# Patient Record
Sex: MALE | Race: WHITE | Employment: FULL TIME | URBAN - METROPOLITAN AREA
[De-identification: names, ages, dates, MRNs, and addresses within clinical notes are randomized per-mention and may not be internally consistent; named-entity substitution may affect disease eponyms.]

---

## 2017-02-24 ENCOUNTER — OFFICE VISIT (OUTPATIENT)
Dept: FAMILY MEDICINE | Facility: CLINIC | Age: 32
End: 2017-02-24
Payer: COMMERCIAL

## 2017-02-24 VITALS
BODY MASS INDEX: 30.12 KG/M2 | HEIGHT: 72 IN | HEART RATE: 84 BPM | SYSTOLIC BLOOD PRESSURE: 140 MMHG | DIASTOLIC BLOOD PRESSURE: 80 MMHG | RESPIRATION RATE: 19 BRPM | TEMPERATURE: 98.5 F | OXYGEN SATURATION: 97 % | WEIGHT: 222.4 LBS

## 2017-02-24 DIAGNOSIS — K21.9 GASTROESOPHAGEAL REFLUX DISEASE WITHOUT ESOPHAGITIS: ICD-10-CM

## 2017-02-24 DIAGNOSIS — G43.109 MIGRAINE WITH AURA AND WITHOUT STATUS MIGRAINOSUS, NOT INTRACTABLE: Primary | ICD-10-CM

## 2017-02-24 PROCEDURE — 99214 OFFICE O/P EST MOD 30 MIN: CPT | Performed by: FAMILY MEDICINE

## 2017-02-24 NOTE — MR AVS SNAPSHOT
After Visit Summary   2/24/2017    [facility] Ascenc[facility]    MRN: [id number]           Patient Information     Date Of Birth          1985        Visit Information        Provider Department      2/24/2017 2:15 PM Gurwinder Nelson MD [facility]        Today's Diagnoses     Migraine with aura and without status migrainosus, not intractable    -  1    Gastroesophageal reflux disease without esophagitis           Follow-ups after your visit        Your next 10 appointments already scheduled     Mar 07, 2017 12:30 PM CST   MR BRAIN W/O CONTRAST with RSCCMR1   [facility] ([facility])    [address]  [facility] [place] [id number] [phone]           Take your medicines as usual, unless your doctor tells you not to. Bring a list of your current medicines to your exam (including vitamins, minerals and over-the-counter drugs). Also bring the results of similar scans you may have had.  Please remove any body piercings and hair extensions before you arrive.  Follow your doctor s orders. If you do not, we may have to postpone your exam.  You will not have contrast for this exam. You do not need to do anything special to prepare.  The MRI machine uses a strong magnet. Please wear clothes without metal (snaps, zippers). A sweatsuit works well, or we may give you a hospital gown.   **IMPORTANT** THE INSTRUCTIONS BELOW ARE ONLY FOR THOSE PATIENTS WHO HAVE BEEN TOLD THEY WILL RECEIVE SEDATION OR GENERAL ANESTHESIA DURING THEIR MRI PROCEDURE:  IF YOU WILL RECEIVE SEDATION (take medicine to help you relax during your exam):   You must get the medicine from your doctor before you arrive. Bring the medicine to the exam. Do not take it at home.   Arrive one hour early. Bring someone who can take you home after the test. Your medicine will make you sleepy. After the exam, you may not drive, take a bus or take a taxi by yourself.   No eating 8 hours  before your exam. You may have clear liquids up until 4 hours before your exam. (Clear liquids include water, clear tea, black coffee and fruit juice without pulp.)  IF YOU WILL RECEIVE ANESTHESIA (be asleep for your exam):   Arrive 1 1/2 hours early. Bring someone who can take you home after the test. You may not drive, take a bus or take a taxi by yourself.   No eating 8 hours before your exam. You may have clear liquids up until 4 hours before your exam. (Clear liquids include water, clear tea, black coffee and fruit juice without pulp.)   You will spend four to five hours in the recovery room.  Please call the Imaging Department at your exam site with any questions.            Mar 07, 2017  1:30 PM CST   MR HEAD W CONTRAST ANGIOGRAM with RSCCMR1   [facility] ([facility])    [address]  [facility] [address] [phone]           Take your medicines as usual, unless your doctor tells you not to. Bring a list of your current medicines to your exam (including vitamins, minerals and over-the-counter drugs).  You will be given intravenous contrast for this exam. To prepare:   The day before your exam, drink extra fluids at least six 8-ounce glasses (unless your doctor tells you to restrict your fluids).   Have a blood test (creatinine test) within 30 days of your exam. Go to your clinic or Diagnostic Imaging Department for this test.  The MRI machine uses a strong magnet. Please wear clothes without metal (snaps, zippers). A sweatsuit works well, or we may give you a hospital gown.  Please remove any body piercings and hair extensions before you arrive. You will also remove watches, jewelry, hairpins, wallets, dentures, partial dental plates and hearing aids. You may wear contact lenses, and you may be able to wear your rings. We have a safe place to keep your personal items, but it is safer to leave them at home.   **IMPORTANT** THE INSTRUCTIONS  BELOW ARE ONLY FOR THOSE PATIENTS WHO HAVE BEEN TOLD THEY WILL RECEIVE SEDATION OR GENERAL ANESTHESIA DURING THEIR MRI PROCEDURE:  IF YOU WILL RECEIVE SEDATION (take medicine to help you relax during your exam):   You must get the medicine from your doctor before you arrive. Bring the medicine to the exam. Do not take it at home.   Arrive one hour early. Bring someone who can take you home after the test. Your medicine will make you sleepy. After the exam, you may not drive, take a bus or take a taxi by yourself.   No eating 8 hours before your exam. You may have clear liquids up until 4 hours before your exam. (Clear liquids include water, clear tea, black coffee and fruit juice without pulp.)  IF YOU WILL RECEIVE ANESTHESIA (be asleep for your exam):   Arrive 1 1/2 hours early. Bring someone who can take you home after the test. You may not drive, take a bus or take a taxi by yourself.   No eating 8 hours before your exam. You may have clear liquids up until 4 hours before your exam. (Clear liquids include water, clear tea, black coffee and fruit juice without pulp.)  Please call the Imaging Department at your exam site with any questions.              Who to contact     If you have questions or need follow up information about today's clinic visit or your schedule please contact [facility] directly at [phone].  Normal or non-critical lab and imaging results will be communicated to you by MyChart, letter or phone within 4 business days after the clinic has received the results. If you do not hear from us within 7 days, please contact the clinic through MyChart or phone. If you have a critical or abnormal lab result, we will notify you by phone as soon as possible.  Submit refill requests through [employer] or call your pharmacy and they will forward the refill request to us. Please allow 3 business days for your refill to be completed.          Additional Information About Your Visit       "  MyChart Information     [employer] lets you send messages to your doctor, view your test results, renew your prescriptions, schedule appointments and more. To sign up, go to [email or web address] . Click on \"Log in\" on the left side of the screen, which will take you to the Welcome page. Then click on \"Sign up Now\" on the right side of the page.     You will be asked to enter the access code listed below, as well as some personal information. Please follow the directions to create your username and password.     Your access code is: [email or web address]  Expires: 2017  9:46 AM     Your access code will  in 90 days. If you need help or a new code, please call your [facility] clinic or [phone].        Care EveryWhere ID     This is your Care EveryWhere ID. This could be used by other organizations to access your [facility] medical records  [id number]        Your Vitals Were     Pulse Temperature Respirations Height Pulse Oximetry BMI (Body Mass Index)    84 98.5  F (36.9  C) (Oral) 19 5' 11.5\" (1.816 m) 97% 30.59 kg/m2       Blood Pressure from Last 3 Encounters:   17 140/80   16 124/86   16 126/84    Weight from Last 3 Encounters:   17 222 lb 6.4 oz (100.9 kg)   16 216 lb (98 kg)   16 228 lb (103.4 kg)                 Today's Medication Changes          These changes are accurate as of: 17 11:59 PM.  If you have any questions, ask your nurse or doctor.               Start taking these medicines.        Dose/Directions    omeprazole 20 MG CR capsule   Commonly known as:  priLOSEC   Used for:  Gastroesophageal reflux disease without esophagitis   Started by:  Gurwinder Nelson MD        Dose:  20 mg   Take 1 capsule (20 mg) by mouth daily   Quantity:  90 capsule   Refills:  3         Stop taking these medicines if you haven't already. Please contact your care team if you have questions.     azithromycin 250 MG tablet   Commonly known as:  ZITHROMAX   Stopped by:  " Gurwinder Nelson MD           guaiFENesin-codeine 100-10 MG/5ML Soln solution   Commonly known as:  ROBITUSSIN AC   Stopped by:  Gurwinder Nelson MD                Where to get your medicines      These medications were sent to [facility] - [place], [place] - [address] AT [address]  [address], [place] [address]     Phone:  [phone]     omeprazole 20 MG CR capsule                Primary Care Provider Office Phone # Fax #    Ramesh Winston [phone]       [facility] [address]  [facility] [address]        Thank you!     Thank you for choosing [facility]  for your care. Our goal is always to provide you with excellent care. Hearing back from our patients is one way we can continue to improve our services. Please take a few minutes to complete the written survey that you may receive in the mail after your visit with us. Thank you!             Your Updated Medication List - Protect others around you: Learn how to safely use, store and throw away your medicines at www.disposemymeds.org.          This list is accurate as of: 2/24/17 11:59 PM.  Always use your most recent med list.                   Brand Name Dispense Instructions for use    omeprazole 20 MG CR capsule    priLOSEC    90 capsule    Take 1 capsule (20 mg) by mouth daily

## 2017-02-24 NOTE — PROGRESS NOTES
SUBJECTIVE:                                                    M[facility] Ascencio is a 31 year old male who presents to clinic today for the following health issues:    Headaches      Duration: this morning     Description  Location: unilateral in the right occipital area   Character: throbbing pain    Intensity:  mild    Accompanying signs and symptoms:    Precipitating or Alleviating factors:  Nausea/vomiting: no  Dizziness: no  Weakness or numbness: no  Visual changes: blind spots (scotoma)  Fever: no   Sinus or URI symptoms YES- cold     History  Head trauma: no  Family history of migraines: YES- Father.   fhx of cerebral aneursym  Previous tests for headaches: no  Neurologist evaluations: no  Able to do daily activities when headache present: YES  Wake with headaches: YES  Daily pain medication use: YES- Advil this morning   Any changes in: nothing     Precipitating or Alleviating factors (light/sound/sleep/caffeine): Light     Therapies tried and outcome: Ibuprofen (Advil, Motrin)    Outcome - usually effective  Frequent/daily pain medication use: no     ROS:  General, neuro, sleep, psych, musculoskeletal system otherwise negative. + for ongoing reflux that is better with PPI    GENERAL: healthy, alert and no distress  EYES: Eyes grossly normal to inspection, PERRL and conjunctivae and sclerae normal  HENT: ear canals and TM's normal, nose and mouth without ulcers or lesions  NECK: no adenopathy, no asymmetry, masses, or scars and thyroid normal to palpation  RESP: lungs clear to auscultation - no rales, rhonchi or wheezes  CV: regular rate and rhythm, normal S1 S2, no S3 or S4, no murmur, click or rub, no peripheral edema and peripheral pulses strong  MS: no gross musculoskeletal defects noted, no edema  SKIN: no suspicious lesions or rashes  NEURO: Normal strength and tone, mentation intact and speech normal  PSYCH: mentation appears normal, affect normal/bright    1. Migraine with aura and without status  migrainosus, not intractable  Given fhx and severity will obtain MRI  - MR Brain w/o Contrast; Future  - MRA Angiogram Head w Contrast; Future    2. Gastroesophageal reflux disease without esophagitis  rx filled.   Pt instructed to come back to the clinic for worsening sx    - omeprazole (PRILOSEC) 20 MG CR capsule; Take 1 capsule (20 mg) by mouth daily  Dispense: 90 capsule; Refill: 3

## 2017-02-24 NOTE — NURSING NOTE
"Chief Complaint   Patient presents with     Headache       Initial Pulse 84  Temp 98.5  F (36.9  C) (Oral)  Resp 19  Ht 5' 11.5\" (1.816 m)  Wt 222 lb 6.4 oz (100.9 kg)  SpO2 97%  BMI 30.59 kg/m2 Estimated body mass index is 30.59 kg/(m^2) as calculated from the following:    Height as of this encounter: 5' 11.5\" (1.816 m).    Weight as of this encounter: 222 lb 6.4 oz (100.9 kg).    Medication Reconciliation: complete    Gloria Puga, [facility]      Health Maintenance- Has Been Reviewed.                "

## 2017-05-08 PROBLEM — G43.909 MIGRAINE HEADACHE: Status: ACTIVE | Noted: 2017-05-08

## 2018-09-25 ENCOUNTER — OFFICE VISIT (OUTPATIENT)
Dept: FAMILY MEDICINE | Facility: CLINIC | Age: 33
End: 2018-09-25
Payer: COMMERCIAL

## 2018-09-25 VITALS
HEART RATE: 83 BPM | RESPIRATION RATE: 12 BRPM | TEMPERATURE: 98.1 F | BODY MASS INDEX: 29.56 KG/M2 | HEIGHT: 70 IN | DIASTOLIC BLOOD PRESSURE: 77 MMHG | OXYGEN SATURATION: 97 % | SYSTOLIC BLOOD PRESSURE: 129 MMHG | WEIGHT: 206.5 LBS

## 2018-09-25 DIAGNOSIS — R10.13 DYSPEPSIA AND DISORDER OF FUNCTION OF STOMACH: Primary | ICD-10-CM

## 2018-09-25 DIAGNOSIS — K31.9 DYSPEPSIA AND DISORDER OF FUNCTION OF STOMACH: Primary | ICD-10-CM

## 2018-09-25 LAB
ERYTHROCYTE [DISTWIDTH] IN BLOOD BY AUTOMATED COUNT: 13.5 % (ref 10–15)
HCT VFR BLD AUTO: 45.6 % (ref 40–53)
HGB BLD-MCNC: 15.1 G/DL (ref 13.3–17.7)
MCH RBC QN AUTO: 31.6 PG (ref 26.5–33)
MCHC RBC AUTO-ENTMCNC: 33.1 G/DL (ref 31.5–36.5)
MCV RBC AUTO: 95 FL (ref 78–100)
PLATELET # BLD AUTO: 218 10E9/L (ref 150–450)
RBC # BLD AUTO: 4.78 10E12/L (ref 4.4–5.9)
WBC # BLD AUTO: 7.9 10E9/L (ref 4–11)

## 2018-09-25 PROCEDURE — 85027 COMPLETE CBC AUTOMATED: CPT | Performed by: FAMILY MEDICINE

## 2018-09-25 PROCEDURE — 80053 COMPREHEN METABOLIC PANEL: CPT | Performed by: FAMILY MEDICINE

## 2018-09-25 PROCEDURE — 99214 OFFICE O/P EST MOD 30 MIN: CPT | Performed by: FAMILY MEDICINE

## 2018-09-25 PROCEDURE — 36415 COLL VENOUS BLD VENIPUNCTURE: CPT | Performed by: FAMILY MEDICINE

## 2018-09-25 NOTE — MR AVS SNAPSHOT
"              After Visit Summary   9/25/2018    [facility] Ascencio    MRN: [id number]           Patient Information     Date Of Birth          1985        Visit Information        Provider Department      9/25/2018 1:00 PM Hadley Sweet MD [facility]        Today's Diagnoses     Dyspepsia and disorder of function of stomach    -  1       Follow-ups after your visit        Follow-up notes from your care team     Return in about 4 weeks (around 10/23/2018).      Who to contact     If you have questions or need follow up information about today's clinic visit or your schedule please contact [facility] directly at [phone].  Normal or non-critical lab and imaging results will be communicated to you by MyChart, letter or phone within 4 business days after the clinic has received the results. If you do not hear from us within 7 days, please contact the clinic through MyChart or phone. If you have a critical or abnormal lab result, we will notify you by phone as soon as possible.  Submit refill requests through [employer] or call your pharmacy and they will forward the refill request to us. Please allow 3 business days for your refill to be completed.          Additional Information About Your Visit        Care EveryWhere ID     This is your Care EveryWhere ID. This could be used by other organizations to access your [facility] medical records  [id number]        Your Vitals Were     Pulse Temperature Respirations Height Pulse Oximetry BMI (Body Mass Index)    83 98.1  F (36.7  C) (Oral) 12 5' 10.25\" (1.784 m) 97% 29.42 kg/m2       Blood Pressure from Last 3 Encounters:   09/25/18 129/77   02/24/17 140/80   12/05/16 124/86    Weight from Last 3 Encounters:   09/25/18 206 lb 8 oz (93.7 kg)   02/24/17 222 lb 6.4 oz (100.9 kg)   12/05/16 216 lb (98 kg)              We Performed the Following     CBC with platelets     Comprehensive metabolic panel          Today's Medication " Changes          These changes are accurate as of 9/25/18  1:30 PM.  If you have any questions, ask your nurse or doctor.               Start taking these medicines.        Dose/Directions    esomeprazole 20 MG CR capsule   Commonly known as:  nexIUM   Used for:  Dyspepsia and disorder of function of stomach   Started by:  Hadley Sweet MD        Dose:  20 mg   Take 1 capsule (20 mg) by mouth every morning (before breakfast) Take 30-60 minutes before eating.   Quantity:  30 capsule   Refills:  0            Where to get your medicines      These medications were sent to [facility] [address] [address]  [address], [facility] [address]     Phone:  [phone]     esomeprazole 20 MG CR capsule                Primary Care Provider Office Phone # Fax #    Ramesh Winston [phone] [address]  [facility] [address]        Equal Access to Services     [facility] HospitalKEATON AH: Hadii aad ku hadasho Soomaali, waaxda luqadaha, qaybta kaalmada adeegyada, waxay idiin haywilfredn kapil kharajuan carlos acuna . So [facility] [phone].    ATENCIÓN: Si habla español, tiene a urbano disposición servicios gratuitos de asistencia lingüística. Ll[facility] [phone].    We comply with applicable federal civil rights laws and [place] laws. We do not discriminate on the basis of race, color, national origin, age, disability, sex, sexual orientation, or gender identity.            Thank you!     Thank you for choosing [facility]  for your care. Our goal is always to provide you with excellent care. Hearing back from our patients is one way we can continue to improve our services. Please take a few minutes to complete the written survey that you may receive in the mail after your visit with us. Thank you!             Your Updated Medication List - Protect others around you: Learn how to safely use, store and throw away your medicines at  www.disposemymeds.org.          This list is accurate as of 9/25/18  1:30 PM.  Always use your most recent med list.                   Brand Name Dispense Instructions for use Diagnosis    esomeprazole 20 MG CR capsule    nexIUM    30 capsule    Take 1 capsule (20 mg) by mouth every morning (before breakfast) Take 30-60 minutes before eating.    Dyspepsia and disorder of function of stomach

## 2018-09-25 NOTE — LETTER
September 26, 2018      M[facility] Ascencio  [address]   [place] [address]        Dear ,    We are writing to inform you of your test results. All blood tests are normal.        Resulted Orders   CBC with platelets   Result Value Ref Range    WBC 7.9 4.0 - 11.0 10e9/L    RBC Count 4.78 4.4 - 5.9 10e12/L    Hemoglobin 15.1 13.3 - 17.7 g/dL    Hematocrit 45.6 40.0 - 53.0 %    MCV 95 78 - 100 fl    MCH 31.6 26.5 - 33.0 pg    MCHC 33.1 31.5 - 36.5 g/dL    RDW 13.5 10.0 - 15.0 %    Platelet Count 218 150 - 450 10e9/L   Comprehensive metabolic panel   Result Value Ref Range    Sodium 139 133 - 144 mmol/L    Potassium 4.0 3.4 - 5.3 mmol/L    Chloride 106 94 - 109 mmol/L    Carbon Dioxide 27 20 - 32 mmol/L    Anion Gap 6 3 - 14 mmol/L    Glucose 82 70 - 99 mg/dL      Comment:      Non Fasting    Urea Nitrogen 11 7 - 30 mg/dL    Creatinine 0.87 0.66 - 1.25 mg/dL    GFR Estimate >90 >60 mL/min/1.7m2      Comment:      Non  GFR Calc    GFR Estimate If Black >90 >60 mL/min/1.7m2      Comment:       GFR Calc    Calcium 8.6 8.5 - 10.1 mg/dL    Bilirubin Total 0.5 0.2 - 1.3 mg/dL    Albumin 3.9 3.4 - 5.0 g/dL    Protein Total 6.9 6.8 - 8.8 g/dL    Alkaline Phosphatase 56 40 - 150 U/L    ALT 35 0 - 70 U/L    AST 24 0 - 45 U/L       If you have any questions or concerns, please call the clinic at the number listed above.       Sincerely,        Hadley Sweet MD

## 2018-09-25 NOTE — PROGRESS NOTES
"  SUBJECTIVE:   Moses Ascencio is a 33 year old male who presents to clinic today for the following health issues:      Gastrointestinal symptoms      Duration: 3 days    Description:           REFLUX SYMPTOMS - heartburn, nausea and chest pain             ABDOMINAL PAIN - epigastric area  SUBJECTIVE:    CC: Moses Ascencio is a 33 year old male who presents for an episode of severe epigastric distress,     HPI: has tobacco, caffeine , moderate alcohol, family history is positive         PROBLEM LIST:                   Patient Active Problem List   Diagnosis     Migraine headache       PAST MEDICAL HISTORY:                History reviewed. No pertinent past medical history.    PAST SURGICAL HISTORY:                History reviewed. No pertinent surgical history.    CURRENT MEDICATIONS:                  No current outpatient prescriptions on file.             FAMILY HISTORY:                   Family History   Problem Relation Age of Onset     Cardiovascular Father        HEALTH MAINTENANCE:                    REVIEW OF OUTSIDE RECORDS: NO    REVIEW OF SYSTEMS:  CONSTITUTIONAL: NEGATIVE for fever, chills  INTEGUMENTARY/SKIN: NEGATIVE for worrisome rashes, moles or lesions  EYES: NEGATIVE for vision changes   ENT/MOUTH: NEGATIVE for ear, mouth and throat problems  RESP: NEGATIVE for significant cough or SOB  CV: NEGATIVE for chest pain, palpitations   GI: NEGATIVE for nausea, abdominal pain, heartburn, or change in bowel habits  : NEGATIVE for frequency, dysuria, or hematuria  MUSCULOSKELETAL: NEGATIVE for significant arthralgias or myalgia  NEURO: NEGATIVE for weakness, dizziness or paresthesias or headache  NVS:no headache or balance issus  INTEG:no moles or new rashes  LYMPH:no nodes or night sweats    EXAM:    /77 (BP Location: Left arm, Patient Position: Chair, Cuff Size: Adult Large)  Pulse 83  Temp 98.1  F (36.7  C) (Oral)  Resp 12  Ht 5' 10.25\" (1.784 m)  Wt 206 lb 8 oz (93.7 kg)  SpO2 97%  BMI " 29.42 kg/m2  GENERAL APPEARANCE: healthy, alert and no distress   EXAM:  GENERAL APPEARANCE: healthy, alert and no distress  EYES: EOMI,  PERRL  HENT: ear canals and TM's normal and nose and mouth without ulcers or lesions  NECK: no adenopathy, no asymmetry, masses, or scars and thyroid normal to palpation  RESP: lungs clear to auscultation - no rales, rhonchi or wheezes  CV: regular rates and rhythm, normal S1 S2, no S3 or S4 and no murmur, click or rub -  ABDOMEN:  soft, nontender, no HSM or masses and bowel sounds normal  BACK:no tenderness or pain on straight let raise           ASSESSMENT/PLAN  (K31.9,  R10.13) Dyspepsia and disorder of function of stomach  (primary encounter diagnosis)  Comment:   Plan: CBC with platelets, Comprehensive metabolic         panel, esomeprazole (NEXIUM) 20 MG CR capsule        He took the PPI for an acute spell,discussed the slow uptake and effects and suggested a 6 week suppressive regimen, follow up JQ after that or if not improved in the interim                       I have discussed with patient the risks, benefits, medications, treatment options and modalities.   I have instructed the patient to call or schedule a follow-up appointment if any problems or failure to improve.

## 2018-09-26 LAB
ALBUMIN SERPL-MCNC: 3.9 G/DL (ref 3.4–5)
ALP SERPL-CCNC: 56 U/L (ref 40–150)
ALT SERPL W P-5'-P-CCNC: 35 U/L (ref 0–70)
ANION GAP SERPL CALCULATED.3IONS-SCNC: 6 MMOL/L (ref 3–14)
AST SERPL W P-5'-P-CCNC: 24 U/L (ref 0–45)
BILIRUB SERPL-MCNC: 0.5 MG/DL (ref 0.2–1.3)
BUN SERPL-MCNC: 11 MG/DL (ref 7–30)
CALCIUM SERPL-MCNC: 8.6 MG/DL (ref 8.5–10.1)
CHLORIDE SERPL-SCNC: 106 MMOL/L (ref 94–109)
CO2 SERPL-SCNC: 27 MMOL/L (ref 20–32)
CREAT SERPL-MCNC: 0.87 MG/DL (ref 0.66–1.25)
GFR SERPL CREATININE-BSD FRML MDRD: >90 ML/MIN/1.7M2
GLUCOSE SERPL-MCNC: 82 MG/DL (ref 70–99)
POTASSIUM SERPL-SCNC: 4 MMOL/L (ref 3.4–5.3)
PROT SERPL-MCNC: 6.9 G/DL (ref 6.8–8.8)
SODIUM SERPL-SCNC: 139 MMOL/L (ref 133–144)

## 2018-11-15 DIAGNOSIS — K31.9 DYSPEPSIA AND DISORDER OF FUNCTION OF STOMACH: ICD-10-CM

## 2018-11-15 DIAGNOSIS — R10.13 DYSPEPSIA AND DISORDER OF FUNCTION OF STOMACH: ICD-10-CM

## 2018-11-15 NOTE — TELEPHONE ENCOUNTER
"Patient states working well    Requested Prescriptions   Pending Prescriptions Disp Refills     esomeprazole (NEXIUM) 20 MG CR capsule 30 capsule 0     Sig: Take 1 capsule (20 mg) by mouth every morning (before breakfast) Take 30-60 minutes before eating.    PPI Protocol Passed    11/15/2018  7:44 AM       Passed - Not on Clopidogrel (unless Pantoprazole ordered)       Passed - No diagnosis of osteoporosis on record       Passed - Recent (12 mo) or future (30 days) visit within the authorizing provider's specialty    Patient had office visit in the last 12 months or has a visit in the next 30 days with authorizing provider or within the authorizing provider's specialty.  See \"Patient Info\" tab in inbasket, or \"Choose Columns\" in Meds & Orders section of the refill encounter.             Passed - Patient is age 18 or older   Last Written Prescription Date:  09/25/2018  Last Fill Quantity: 30,  # refills: 0   Last office visit: 9/25/2018 with prescribing provider:  09/25/2018   Future Office Visit:          Swapna Alexander   "

## 2020-02-11 ENCOUNTER — OFFICE VISIT (OUTPATIENT)
Dept: FAMILY MEDICINE | Facility: CLINIC | Age: 35
End: 2020-02-11

## 2020-02-11 VITALS
WEIGHT: 201 LBS | RESPIRATION RATE: 16 BRPM | DIASTOLIC BLOOD PRESSURE: 72 MMHG | HEIGHT: 71 IN | BODY MASS INDEX: 28.14 KG/M2 | TEMPERATURE: 97.9 F | OXYGEN SATURATION: 98 % | HEART RATE: 70 BPM | SYSTOLIC BLOOD PRESSURE: 124 MMHG

## 2020-02-11 DIAGNOSIS — K31.9 DYSPEPSIA AND DISORDER OF FUNCTION OF STOMACH: ICD-10-CM

## 2020-02-11 DIAGNOSIS — Z00.00 ROUTINE GENERAL MEDICAL EXAMINATION AT A HEALTH CARE FACILITY: Primary | ICD-10-CM

## 2020-02-11 DIAGNOSIS — R10.13 DYSPEPSIA AND DISORDER OF FUNCTION OF STOMACH: ICD-10-CM

## 2020-02-11 DIAGNOSIS — K13.0 MUCOCELE OF LIP: ICD-10-CM

## 2020-02-11 DIAGNOSIS — Z23 NEED FOR PROPHYLACTIC VACCINATION AND INOCULATION AGAINST INFLUENZA: ICD-10-CM

## 2020-02-11 LAB
ANION GAP SERPL CALCULATED.3IONS-SCNC: 8 MMOL/L (ref 3–14)
BUN SERPL-MCNC: 17 MG/DL (ref 7–30)
CALCIUM SERPL-MCNC: 9.2 MG/DL (ref 8.5–10.1)
CHLORIDE SERPL-SCNC: 109 MMOL/L (ref 94–109)
CHOLEST SERPL-MCNC: 186 MG/DL
CO2 SERPL-SCNC: 23 MMOL/L (ref 20–32)
CREAT SERPL-MCNC: 0.9 MG/DL (ref 0.66–1.25)
ERYTHROCYTE [DISTWIDTH] IN BLOOD BY AUTOMATED COUNT: 12.8 % (ref 10–15)
GFR SERPL CREATININE-BSD FRML MDRD: >90 ML/MIN/{1.73_M2}
GLUCOSE SERPL-MCNC: 88 MG/DL (ref 70–99)
HCT VFR BLD AUTO: 47.4 % (ref 40–53)
HDLC SERPL-MCNC: 51 MG/DL
HGB BLD-MCNC: 15.9 G/DL (ref 13.3–17.7)
LDLC SERPL CALC-MCNC: 110 MG/DL
MCH RBC QN AUTO: 31.6 PG (ref 26.5–33)
MCHC RBC AUTO-ENTMCNC: 33.5 G/DL (ref 31.5–36.5)
MCV RBC AUTO: 94 FL (ref 78–100)
NONHDLC SERPL-MCNC: 135 MG/DL
PLATELET # BLD AUTO: 249 10E9/L (ref 150–450)
POTASSIUM SERPL-SCNC: 4.1 MMOL/L (ref 3.4–5.3)
RBC # BLD AUTO: 5.03 10E12/L (ref 4.4–5.9)
SODIUM SERPL-SCNC: 140 MMOL/L (ref 133–144)
TRIGL SERPL-MCNC: 126 MG/DL
WBC # BLD AUTO: 6.9 10E9/L (ref 4–11)

## 2020-02-11 PROCEDURE — 99213 OFFICE O/P EST LOW 20 MIN: CPT | Mod: 25 | Performed by: FAMILY MEDICINE

## 2020-02-11 PROCEDURE — 80048 BASIC METABOLIC PNL TOTAL CA: CPT | Performed by: FAMILY MEDICINE

## 2020-02-11 PROCEDURE — 80061 LIPID PANEL: CPT | Performed by: FAMILY MEDICINE

## 2020-02-11 PROCEDURE — 90471 IMMUNIZATION ADMIN: CPT | Performed by: FAMILY MEDICINE

## 2020-02-11 PROCEDURE — 36415 COLL VENOUS BLD VENIPUNCTURE: CPT | Performed by: FAMILY MEDICINE

## 2020-02-11 PROCEDURE — 85027 COMPLETE CBC AUTOMATED: CPT | Performed by: FAMILY MEDICINE

## 2020-02-11 PROCEDURE — 90686 IIV4 VACC NO PRSV 0.5 ML IM: CPT | Performed by: FAMILY MEDICINE

## 2020-02-11 PROCEDURE — 99395 PREV VISIT EST AGE 18-39: CPT | Mod: 25 | Performed by: FAMILY MEDICINE

## 2020-02-11 ASSESSMENT — ENCOUNTER SYMPTOMS
FREQUENCY: 0
CHILLS: 0
WEAKNESS: 0
JOINT SWELLING: 0
DIZZINESS: 0
HEARTBURN: 1
NERVOUS/ANXIOUS: 0
EYE PAIN: 0
HEMATOCHEZIA: 0
ABDOMINAL PAIN: 0
COUGH: 1
FEVER: 0
NAUSEA: 0
PALPITATIONS: 0
HEADACHES: 0
PARESTHESIAS: 0
CONSTIPATION: 0
HEMATURIA: 0
DYSURIA: 0
DIARRHEA: 0
SHORTNESS OF BREATH: 0
SORE THROAT: 0

## 2020-02-11 ASSESSMENT — MIFFLIN-ST. JEOR: SCORE: 1875.92

## 2020-02-11 NOTE — PROGRESS NOTES
SUBJECTIVE:   CC: Moses Ascencio is an 34 year old male who presents for preventative health visit.     Healthy Habits:     Getting at least 3 servings of Calcium per day:  NO    Bi-annual eye exam:  NO    Dental care twice a year:  Yes    Sleep apnea or symptoms of sleep apnea:  Excessive snoring    Diet:  Regular (no restrictions)    Frequency of exercise:  4-5 days/week    Duration of exercise:  30-45 minutes    Taking medications regularly:  Yes    Medication side effects:  None    PHQ-2 Total Score: 0    Additional concerns today:  Yes    Patient additionally concerned with some skin changes.  Right lower lip, noticed a small round bump, start approximate months ago, does not change in size however occasionally changes in color from dark to white.  Never bleeds or drains.  Has not tried to self vega the lesion.  No known precipitating cause, did not bite lip.  Daily tobacco smoker, never chews.  No fevers or unintentional weight changes.    History of murmur in childhood, also had palpitations in the past.  Concerned as father  from arrhythmia at 53.  Has an apple watch unsure if it has rhythm monitoring.  Per chart review, was seen at [facility], had Holter monitor which was unremarkable for dysrhythmias.    Heartburn is controlled.  No longer taking PPI, has avoided pizza, spicy food which is his biggest triggers.    Today's PHQ-2 Score:   PHQ-2 (  [employer]) 2020   Q1: Little interest or pleasure in doing things 0   Q2: Feeling down, depressed or hopeless 0   PHQ-2 Score 0   Q1: Little interest or pleasure in doing things Not at all   Q2: Feeling down, depressed or hopeless Not at all   PHQ-2 Score 0       Abuse: Current or Past(Physical, Sexual or Emotional)- No  Do you feel safe in your environment? Yes      Social History     Tobacco Use     Smoking status: Current Some Day Smoker     Packs/day: 1.00     Smokeless tobacco: Never Used   Substance Use Topics     Alcohol use: Yes      "Alcohol/week: 0.0 standard drinks     Comment: occ     If you drink alcohol do you typically have >3 drinks per day or >7 drinks per week? No    Alcohol Use 2/11/2020   Prescreen: >3 drinks/day or >7 drinks/week? No   Prescreen: >3 drinks/day or >7 drinks/week? -       Last PSA: No results found for: PSA    Reviewed orders with patient. Reviewed health maintenance and updated orders accordingly - Yes  Lab work is in process  Labs reviewed in EPIC    Reviewed and updated as needed this visit by clinical staff  Tobacco  Meds  Med Hx  Surg Hx  Fam Hx  Soc Hx        Reviewed and updated as needed this visit by Provider        History reviewed. No pertinent past medical history.   Past Surgical History:   Procedure Laterality Date     history of palpitation      trey, s/p normal holter monitoring.        Review of Systems   Constitutional: Negative for chills and fever.   HENT: Positive for congestion. Negative for ear pain, hearing loss and sore throat.    Eyes: Negative for pain and visual disturbance.   Respiratory: Positive for cough. Negative for shortness of breath.    Cardiovascular: Negative for chest pain, palpitations and peripheral edema.   Gastrointestinal: Positive for heartburn. Negative for abdominal pain, constipation, diarrhea, hematochezia and nausea.   Genitourinary: Negative for discharge, dysuria, frequency, genital sores, hematuria, impotence and urgency.   Musculoskeletal: Negative for joint swelling.   Skin: Negative for rash.   Neurological: Negative for dizziness, weakness, headaches and paresthesias.   Psychiatric/Behavioral: Negative for mood changes. The patient is not nervous/anxious.        OBJECTIVE:   /72 (BP Location: Right arm, Patient Position: Chair, Cuff Size: Adult Regular)   Pulse 70   Temp 97.9  F (36.6  C) (Oral)   Resp 16   Ht 1.791 m (5' 10.5\")   Wt 91.2 kg (201 lb)   SpO2 98%   BMI 28.43 kg/m      Physical Exam  Vitals signs reviewed.   Constitutional:       " General: He is not in acute distress.     Appearance: Normal appearance. He is not ill-appearing.   HENT:      Head: Normocephalic and atraumatic.      Right Ear: External ear normal.      Left Ear: External ear normal.      Nose: Nose normal.      Mouth/Throat:      Mouth: Mucous membranes are moist.      Pharynx: Oropharynx is clear.   Eyes:      General: No scleral icterus.        Right eye: No discharge.         Left eye: No discharge.      Extraocular Movements: Extraocular movements intact.      Pupils: Pupils are equal, round, and reactive to light.   Neck:      Musculoskeletal: Normal range of motion.      Vascular: No JVD.   Cardiovascular:      Rate and Rhythm: Normal rate and regular rhythm.      Heart sounds: No murmur.   Pulmonary:      Effort: Pulmonary effort is normal. No respiratory distress.      Breath sounds: Normal breath sounds.   Abdominal:      General: Abdomen is flat. Bowel sounds are normal.      Palpations: Abdomen is soft.   Genitourinary:     Comments: Deferred exam  Musculoskeletal:         General: No swelling.   Lymphadenopathy:      Cervical: No cervical adenopathy.   Skin:     General: Skin is warm.      Capillary Refill: Capillary refill takes less than 2 seconds.      Comments: Right lower lip, vermilion border, ~0.5 cm domed lesion   Neurological:      General: No focal deficit present.      Mental Status: He is alert.   Psychiatric:         Mood and Affect: Mood normal.         Behavior: Behavior normal.         Diagnostic Test Results:  Labs reviewed in Epic     ASSESSMENT/PLAN:   1. Routine general medical examination at a health care facility  - Lipid panel reflex to direct LDL Fasting  - CBC with platelets  - Basic metabolic panel  (Ca, Cl, CO2, Creat, Gluc, K, Na, BUN)    2. Need for prophylactic vaccination and inoculation against influenza  - Vaccine Administration, Initial [80148]    3. Mucocele of lip  Will refer to ENT, appreciate assistance.  - OTOLARYNGOLOGY  "REFERRAL    4. Dyspepsia and disorder of function of stomach  Controlled with conservative measures.  Self discontinued PPI, medication list updated.      COUNSELING:   Reviewed preventive health counseling, as reflected in patient instructions       Regular exercise       Healthy diet/nutrition       Immunizations    Vaccinated for: Influenza             HIV screeninx in teen years, 1x in adult years, and at intervals if high risk    Estimated body mass index is 28.43 kg/m  as calculated from the following:    Height as of this encounter: 1.791 m (5' 10.5\").    Weight as of this encounter: 91.2 kg (201 lb).     Weight management plan: Discussed healthy diet and exercise guidelines     reports that he has been smoking. He has been smoking about 1.00 pack per day. He has never used smokeless tobacco.  Tobacco Cessation Action Plan: Information offered: Patient not interested at this time    Counseling Resources:  ATP IV Guidelines  Pooled Cohorts Equation Calculator  FRAX Risk Assessment  ICSI Preventive Guidelines  Dietary Guidelines for Americans, 2010  USDA's MyPlate  ASA Prophylaxis  Lung CA Screening    Manjeet Moise MD  [facility]    Documentation was prepared using Dragon voice recognition software. Please excuse typographical errors. Please contact me if documentation is unclear.    "

## 2020-02-11 NOTE — PATIENT INSTRUCTIONS
"  Preventive Health Recommendations  Male Ages 26 - 39    Yearly exam:             See your health care provider every year in order to  o   Review health changes.   o   Discuss preventive care.    o   Review your medicines if your doctor has prescribed any.    You should be tested each year for STDs (sexually transmitted diseases), if you re at risk.     After age 35, talk to your provider about cholesterol testing. If you are at risk for heart disease, have your cholesterol tested at least every 5 years.     If you are at risk for diabetes, you should have a diabetes test (fasting glucose).  Shots: Get a flu shot each year. Get a tetanus shot every 10 years.     Nutrition:    Eat at least 5 servings of fruits and vegetables daily.     Eat whole-grain bread, whole-wheat pasta and brown rice instead of white grains and rice.     Get adequate Calcium and Vitamin D.     Lifestyle    Exercise for at least 150 minutes a week (30 minutes a day, 5 days a week). This will help you control your weight and prevent disease.     Limit alcohol to one drink per day.     No smoking.     Wear sunscreen to prevent skin cancer.     See your dentist every six months for an exam and cleaning.       KERATOSIS PILARIS    Keratosis Pilaris (KP) is a common skin condition that is not harmful.  It tends to run in families and usually affects the upper arms, and sometimes affects the cheeks and thighs.  Facial involvement tends to improve with age (after childhood).  There is no cure for keratosis pilaris, but certain moisturizers (see below) may make the bumps smoother and less obvious.  If the KP is itchy or inflamed, your doctor may prescribe a medication to improve these symptoms  Recommended moisturizers:   Ammonium lactate cream or lotion, 4% or 8% (brand names include AmLactin and LacHydrin)  CeraVe SA lotion  Eucerin \"Smoothing Repair\" Or \"Professional Repair\" lotion  Eucerin Roughness Relief Lotion   Sometimes these are kept behind " "the pharmacy counter or need to be ordered by the pharmacist.  They are also available for purchase on the internet.  P    KERATOSIS PILARIS    Keratosis Pilaris (KP) is a common skin condition that is not harmful.  It tends to run in families and usually affects the upper arms, and sometimes affects the cheeks and thighs.  Facial involvement tends to improve with age (after childhood).  There is no cure for keratosis pilaris, but certain moisturizers (see below) may make the bumps smoother and less obvious.  If the KP is itchy or inflamed, your doctor may prescribe a medication to improve these symptoms  Recommended moisturizers:   Ammonium lactate cream or lotion, 4% or 8% (brand names include AmLactin and LacHydrin)  CeraVe SA lotion  Eucerin \"Smoothing Repair\" Or \"Professional Repair\" lotion  Eucerin Roughness Relief Lotion   Sometimes these are kept behind the pharmacy counter or need to be ordered by the pharmacist.  They are also available for purchase on the internet.        "

## 2020-02-13 ENCOUNTER — TRANSFERRED RECORDS (OUTPATIENT)
Dept: HEALTH INFORMATION MANAGEMENT | Facility: CLINIC | Age: 35
End: 2020-02-13

## 2020-02-21 ENCOUNTER — HOSPITAL PATHOLOGY (OUTPATIENT)
Dept: OTHER | Facility: CLINIC | Age: 35
End: 2020-02-21

## 2020-02-25 LAB — COPATH REPORT: NORMAL

## 2020-04-21 ENCOUNTER — HOSPITAL ENCOUNTER (EMERGENCY)
Facility: CLINIC | Age: 35
Discharge: HOME | End: 2020-04-21
Attending: PHYSICIAN ASSISTANT | Admitting: PHYSICIAN ASSISTANT

## 2020-04-21 VITALS
SYSTOLIC BLOOD PRESSURE: 152 MMHG | OXYGEN SATURATION: 98 % | HEART RATE: 103 BPM | TEMPERATURE: 98.2 F | DIASTOLIC BLOOD PRESSURE: 99 MMHG | RESPIRATION RATE: 16 BRPM
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
## 2020-04-21 DIAGNOSIS — S41.119A ARM LACERATION: ICD-10-CM

## 2020-04-21 PROCEDURE — 25000132 ZZH RX MED GY IP 250 OP 250 PS 637: Performed by: PHYSICIAN ASSISTANT

## 2020-04-21 PROCEDURE — 12001 RPR S/N/AX/GEN/TRNK 2.5CM/<: CPT

## 2020-04-21 PROCEDURE — 12002 RPR S/N/AX/GEN/TRNK2.6-7.5CM: CPT

## 2020-04-21 PROCEDURE — 25000128 H RX IP 250 OP 636: Performed by: PHYSICIAN ASSISTANT

## 2020-04-21 PROCEDURE — 90471 IMMUNIZATION ADMIN: CPT

## 2020-04-21 PROCEDURE — 99283 EMERGENCY DEPT VISIT LOW MDM: CPT | Mod: 25

## 2020-04-21 PROCEDURE — 90715 TDAP VACCINE 7 YRS/> IM: CPT | Performed by: PHYSICIAN ASSISTANT

## 2020-04-21 RX ORDER — LIDOCAINE HYDROCHLORIDE AND EPINEPHRINE 10; 10 MG/ML; UG/ML
INJECTION, SOLUTION INFILTRATION; PERINEURAL
Status: DISCONTINUED
Start: 2020-04-21 | End: 2020-04-21 | Stop reason: HOSPADM

## 2020-04-21 RX ADMIN — AMOXICILLIN AND CLAVULANATE POTASSIUM 1 TABLET: 875; 125 TABLET, FILM COATED ORAL at 15:41

## 2020-04-21 RX ADMIN — CLOSTRIDIUM TETANI TOXOID ANTIGEN (FORMALDEHYDE INACTIVATED), CORYNEBACTERIUM DIPHTHERIAE TOXOID ANTIGEN (FORMALDEHYDE INACTIVATED), BORDETELLA PERTUSSIS TOXOID ANTIGEN (GLUTARALDEHYDE INACTIVATED), BORDETELLA PERTUSSIS FILAMENTOUS HEMAGGLUTININ ANTIGEN (FORMALDEHYDE INACTIVATED), BORDETELLA PERTUSSIS PERTACTIN ANTIGEN, AND BORDETELLA PERTUSSIS FIMBRIAE 2/3 ANTIGEN 0.5 ML: 5; 2; 2.5; 5; 3; 5 INJECTION, SUSPENSION INTRAMUSCULAR at 14:55

## 2020-04-21 ASSESSMENT — ENCOUNTER SYMPTOMS
NUMBNESS: 0
WOUND: 1

## 2020-04-21 NOTE — ED PROVIDER NOTES
History     Chief Complaint:  Dog Bite    HPI   Moses Ascencio is an otherwise healthy 34 year old male who presents with dog bite. The patient reports his sister recently got a 2-year-old Ethiopian Rendon. He states today at 1420, the dog bit his right upper arm. He notes the dog is up to date on shots. He denies numbness. His last Td/Tdap was in 2014. No other injury.    Allergies:  No known drug allergies    Medications:    The patient is not currently taking any prescribed medications.    Past Medical History:    Migraines    Past Surgical History:    History reviewed. No pertinent surgical history.    Family History:    Cardiovascular (Father)    Social History:  Smoking status: Yes, 1.00 packs/day  Alcohol use: Yes, occasionally  Drug use: No  PCP: [facility]  Marital Status:  Single [1]    Review of Systems   Skin: Positive for wound.   Neurological: Negative for numbness.   All other systems reviewed and are negative.    Physical Exam     Patient Vitals for the past 24 hrs:   BP Temp Temp src Pulse Heart Rate Resp SpO2   04/21/20 1440 (!) 152/99 -- -- -- -- -- 98 %   04/21/20 1435 (!) 152/99 98.2  F (36.8  C) Oral 103 103 16 100 %   04/21/20 1432 -- 97.3  F (36.3  C) Temporal -- 100 18 97 %     Physical Exam  General: Well appearing, well nourished. Normal mood and affect.  Skin: Large 7 cm gaping laceration to the palmar aspect of the right forearm laceration extends to through the subcutaneous tissue.  No evidence of this extends through any muscle or tendon tissue.  No visualized foreign body.  HEENT: Head: Normocephalic, atraumatic, no visible masses.   Eyes: Conjunctiva clear.  Cardiac: Normal rate and regular rhythm, no murmur or gallop.   Lungs: Clear to auscultation.  Abdomen: Abdomen soft, non-tender. No rebound tenderness of guarding.   Musculoskeletal: Normal gait and station.   Neurologic: Oriented x 3. GCS: 15.  Psychiatric: Intact recent and remote memory, judgment and insight,  normal mood and affect.     Emergency Department Course   Procedures:    Laceration Repair        LACERATION:  A subcutaneous minimally Contaminated 7 cm laceration.      LOCATION:  Right forearm reynolds aspect      FUNCTION:  Distally sensation, circulation, motor and tendon function are intact.      ANESTHESIA:  Local using 1% lidocaine with epinephrine total of 5 mLs      PREPARATION:  Irrigation and Scrubbing with Normal Saline and Shur Clens      DEBRIDEMENT:  no debridement      CLOSURE:  Wound was closed with One Layer.  Skin closed with 7 x 4.0 Ethylon using interrupted sutures.    Interventions:  1455: Adacel 0.5 mL IM  1541: Augmentin 1 PO    Emergency Department Course:  Past medical records, nursing notes, and vitals reviewed.  1440: I performed an exam of the patient and obtained history, as documented above.    1446: I prepped the patient's laceration.    1520: I performed a laceration repair. See note above.    Findings and plan explained to the Patient. Patient discharged home with instructions regarding supportive care, medications, and reasons to return. The importance of close follow-up was reviewed.     Impression & Plan    Medical Decision Making:  Moses Ascencio is a 34 year old male who presents for evaluation of a dog bite to the right forearm.  The workup here in the ED shows no signs of compartment syndrome, significant lacerations, tendon or bone injury.  X-ray was not completed as the wound is large enough for me to evaluate and there were no signs of foreign body.  As the wound was large and gaping, we did close it loosely although we did discuss that this increases his infection risk.  A police report has been filed for the dog.  Dog is known so will have them observe for signs of rabies; no rabies shots indicated from ED.  The wounds were scrubbed and washed out with high pressure irrigation.  Will start augmentin and have them observe for signs of infection (pain, redness, warmth, red  streaks, etc).  First dose given here.  We discussed wound care and dressing changes.  He will watch this wound incredibly closely given increased risk of animal bite with suture closure although this was completed loosely.  All questions were answered prior to the patient's discharge. He was in agreement with the plan stated above.     Diagnosis:    ICD-10-CM    1. Dog bite, initial encounter  W54.0XXA    2. Arm laceration  S41.119A      Disposition:  discharged to home    Discharge Medications:  New Prescriptions    AMOXICILLIN-CLAVULANATE (AUGMENTIN) 875-125 MG TABLET    Take 1 tablet by mouth 2 times daily for 7 days     Oh Katz  4/21/2020   [facility] EMERGENCY DEPARTMENT  I, Oh Katz, am serving as a scribe at 2:40 PM on 4/21/2020 to document services personally performed by Jacy Saxena PA based on my observations and the provider's statements to me.     This was created at least in part with a voice recognition software. Mistakes/typos may be present.        Jacy Saxena PA  04/21/20 2033

## 2020-04-21 NOTE — DISCHARGE INSTRUCTIONS
Begin taking the antibiotics.  Tylenol ibuprofen for pain control.  Return if it is having significant purulent drainage, redness streaking up or down the arm, fevers.  Watch the area surrounding the wound for signs of infection which can include increased redness, drainage, fevers, or swelling. Inspect the area daily. No swimming or baths for the next 3-5 days, showering is ok. See primary clinic in 7-10 days for stitches/staple removal. Apply antibacterial ointment such as bacitracin to the wound daily.

## 2020-04-21 NOTE — ED TRIAGE NOTES
Pt presents to ED with right arm dog bite. Dog that bit him is known and utd on vaccines. Palpable pulse. Bleeding controlled.

## 2020-04-21 NOTE — ED AVS SNAPSHOT
[facility] Emergency Department  [address]  [facility] [address]  Phone:  [phone]  Fax:  [phone]                                    Moses Ascencio   MRN: [id number]    Department:  [facility] Emergency Department   Date of Visit:  4/21/2020           After Visit Summary Signature Page    I have received my discharge instructions, and my questions have been answered. I have discussed any challenges I see with this plan with the nurse or doctor.    ..........................................................................................................................................  Patient/Patient Representative Signature      ..........................................................................................................................................  Patient Representative Print Name and Relationship to Patient    ..................................................               ................................................  Date                                   Time    ..........................................................................................................................................  Reviewed by Signature/Title    ...................................................              ..............................................  Date                                               Time          22EPIC Rev 08/18

## 2021-01-15 ENCOUNTER — HEALTH MAINTENANCE LETTER (OUTPATIENT)
Age: 36
End: 2021-01-15

## 2021-03-14 ENCOUNTER — HEALTH MAINTENANCE LETTER (OUTPATIENT)
Age: 36
End: 2021-03-14

## 2021-08-22 ENCOUNTER — NURSE TRIAGE (OUTPATIENT)
Dept: NURSING | Facility: CLINIC | Age: 36
End: 2021-08-22

## 2021-08-22 NOTE — TELEPHONE ENCOUNTER
Patient gave verbal permission with wife.    They were at an outdoor concert and patient stepped on a large nail in the grouund.   The nail went through his shoe.      Foot is sore and swollen, there was little amount of blood.    Advised to be seen in Urgent Care today.    COVID 19 Nurse Triage Plan/Patient Instructions    Please be aware that novel coronavirus (COVID-19) may be circulating in the community. If you develop symptoms such as fever, cough, or SOB or if you have concerns about the presence of another infection including coronavirus (COVID-19), please contact your health care provider or visit [email or web address].     Disposition/Instructions    In-Person Visit with provider recommended. Reference Visit Selection Guide.    Thank you for taking steps to prevent the spread of this virus.  o Limit your contact with others.  o Wear a simple mask to cover your cough.  o Wash your hands well and often.    Resources    [facility]: About COVID-19: [email or web address]    CDC: What to Do If You're Sick: www.cdc.gov/coronavirus/2019-ncov/about/steps-when-sick.html    CDC: Ending Home Isolation: www.cdc.gov/coronavirus/2019-ncov/hcp/disposition-in-home-patients.html     CDC: Caring for Someone: www.cdc.gov/coronavirus/2019-ncov/if-you-are-sick/care-for-someone.html     [facility]: Interim Guidance for Hospital Discharge to Home: www.health.[facility].[place].us/diseases/coronavirus/hcp/hospdischarge.pdf    [facility] clinical trials (COVID-19 research studies): [email or web address]     Below are the COVID-19 hotlines at the [place] Department of Health ([facility]). Interpreters are available.   o For health questions: Call 552-667-8577 or 1-164.961.1871 (7 a.m. to 7 p.m.)  o For questions about schools and childcare: Call 873-319-8389 or 1-587.983.7294 (7 a.m. to 7 p.m.)                       Reason for Disposition    [1] Puncture wound of foot AND [2] puncture went  through shoe (e.g., tennis shoe)    Additional Information    Negative: [1] Puncture wound of head, neck, chest, back, or abdomen AND [2] sounds life-threatening to the triager    Negative: Shock suspected (e.g., cold/pale/clammy skin, too weak to stand, low BP, rapid pulse)    Negative: Sounds like a life-threatening emergency to the triager    Negative: [1] Caused by a needlestick or other sharp object AND [2] possible exposure to another person's body fluids    Negative: Caused by an animal bite    Negative: Caused by a human bite    Negative: Caused by a marine animal sting or bite    Negative: Skin is cut or scraped, not punctured    Negative: Puncture wound of eye or eyelid    Negative: Foreign body is still in the skin (e.g., splinter, sliver, fishhook)    Negative: [1] Puncture wound of head, neck, chest, abdomen, or overlying a joint AND [2] could be deep    Negative: High pressure injection injury (e.g., from grease gun or paint gun, usually work-related)    Negative: Sounds like a serious injury to the triager    Negative: [1] SEVERE pain AND [2] not improved 2 hours after pain medicine    Negative: [1] Puncture wound of foot AND [2] hurts too much to walk on it (i.e., unable to bear weight, severe limp)    Negative: [1] Puncture wound of bare foot (no shoes) AND [2] setting was dirty  (Exception: shallow puncture)    Protocols used: PUNCTURE WOUND-A-AH

## 2021-10-24 ENCOUNTER — HEALTH MAINTENANCE LETTER (OUTPATIENT)
Age: 36
End: 2021-10-24

## 2022-03-17 ENCOUNTER — OFFICE VISIT (OUTPATIENT)
Dept: FAMILY MEDICINE | Facility: CLINIC | Age: 37
End: 2022-03-17
Payer: COMMERCIAL

## 2022-03-17 VITALS
RESPIRATION RATE: 16 BRPM | DIASTOLIC BLOOD PRESSURE: 68 MMHG | BODY MASS INDEX: 28.98 KG/M2 | SYSTOLIC BLOOD PRESSURE: 122 MMHG | HEART RATE: 76 BPM | TEMPERATURE: 97.9 F | OXYGEN SATURATION: 98 % | WEIGHT: 207 LBS | HEIGHT: 71 IN

## 2022-03-17 DIAGNOSIS — M77.8 TENDINITIS OF FINGER OF LEFT HAND: Primary | ICD-10-CM

## 2022-03-17 PROCEDURE — 99213 OFFICE O/P EST LOW 20 MIN: CPT | Performed by: NURSE PRACTITIONER

## 2022-03-17 NOTE — PROGRESS NOTES
"  Assessment & Plan     Tendinitis of finger of left hand  Rest the affected painful area as much as possible.  Apply ice for 15-20 minutes intermittently as needed and especially after any offending activity. Daily stretching.  As pain recedes, begin normal activities slowly as tolerated.  Consider Physical Therapy if symptoms not better with symptomatic care.    Imaging if persistent pain      Tobacco Cessation:   reports that he has been smoking. He has been smoking about 1.00 pack per day. He has never used smokeless tobacco.      BMI:   Estimated body mass index is 28.87 kg/m  as calculated from the following:    Height as of this encounter: 1.803 m (5' 11\").    Weight as of this encounter: 93.9 kg (207 lb).     Return in about 6 months (around 9/17/2022) for Routine preventive.    Michelle Horner, [facility] MATTIE Lopes is a 36 year old who presents for the following health issues     Musculoskeletal Problem    History of Present Illness       Reason for visit:  Left hand pinkie  Symptom onset:  1-3 days ago    He eats 2-3 servings of fruits and vegetables daily.He consumes 1 sweetened beverage(s) daily.He exercises with enough effort to increase his heart rate 20 to 29 minutes per day.  He exercises with enough effort to increase his heart rate 4 days per week.   He is taking medications regularly.       Concern - will probably need x-ray on left hand. playing hockey, caught left pinkie on jersey of another play and bent.  it is swollen but no bruising.  Onset: x 1 day   Description: bent back finger playing hockey   Intensity: moderate to severe   Progression of Symptoms:  worsening  Accompanying Signs & Symptoms: swelling   Previous history of similar problem: no   Precipitating factors:        Worsened by: movement   Alleviating factors:        Improved by: ice , ibuprofen helps a little   Therapies tried and outcome:  none         Review of Systems   Constitutional, " "HEENT, cardiovascular, pulmonary, GI, , musculoskeletal, neuro, skin, endocrine and psych systems are negative, except as otherwise noted.      Objective    /68   Pulse 76   Temp 97.9  F (36.6  C) (Tympanic)   Resp 16   Ht 1.803 m (5' 11\")   Wt 93.9 kg (207 lb)   SpO2 98%   BMI 28.87 kg/m    Body mass index is 28.87 kg/m .  Physical Exam   GENERAL: healthy, alert and no distress  MS: slight tenderness and swelling to left 5th finger, no ecchymoses  SKIN: no suspicious lesions or rashes  NEURO: Normal strength and tone, mentation intact and speech normal              ARNOLD Dwyer-FABY     [address] [address]  [email or web address]   Office: [phone]                 "

## 2022-04-10 ENCOUNTER — HEALTH MAINTENANCE LETTER (OUTPATIENT)
Age: 37
End: 2022-04-10

## 2022-04-15 ENCOUNTER — OFFICE VISIT (OUTPATIENT)
Dept: FAMILY MEDICINE | Facility: CLINIC | Age: 37
End: 2022-04-15
Payer: COMMERCIAL

## 2022-04-15 VITALS
DIASTOLIC BLOOD PRESSURE: 60 MMHG | BODY MASS INDEX: 28.84 KG/M2 | SYSTOLIC BLOOD PRESSURE: 110 MMHG | TEMPERATURE: 97.9 F | HEIGHT: 71 IN | WEIGHT: 206 LBS | OXYGEN SATURATION: 98 % | HEART RATE: 82 BPM

## 2022-04-15 DIAGNOSIS — K40.90 RIGHT INGUINAL HERNIA: Primary | ICD-10-CM

## 2022-04-15 PROCEDURE — 99213 OFFICE O/P EST LOW 20 MIN: CPT | Performed by: NURSE PRACTITIONER

## 2022-04-15 NOTE — PROGRESS NOTES
"  Assessment & Plan     Right inguinal hernia  - Adult General Surg Referral           Tobacco Cessation:   reports that he has been smoking. He has been smoking about 1.00 pack per day. He has never used smokeless tobacco.      BMI:   Estimated body mass index is 28.73 kg/m  as calculated from the following:    Height as of this encounter: 1.803 m (5' 11\").    Weight as of this encounter: 93.4 kg (206 lb).       Return in about 1 month (around 5/15/2022) for specialists visits as planned.    Michelle Horner [facility] MATTIE Lopes is a 36 year old who presents for the following health issues     History of Present Illness       Reason for visit:  I believe i have a sports hernia  Symptom onset:  1-2 weeks ago  Symptoms include:  Lower ab and right groin discomfort/pain  Symptom intensity:  Mild  Symptom progression:  Staying the same  Had these symptoms before:  No    He eats 2-3 servings of fruits and vegetables daily.He consumes 2 sweetened beverage(s) daily.He exercises with enough effort to increase his heart rate 20 to 29 minutes per day.  He exercises with enough effort to increase his heart rate 3 or less days per week.   He is taking medications regularly.       Concern - Right Lower Abd/Groin poan  Onset: 1-2 weeks  Description: pain /fullness  Intensity: mild  Progression of Symptoms:  same  Accompanying Signs & Symptoms: Hurts lifting  Previous history of similar problem: No  Precipitating factors:        Worsened by: Coughing and Sneezing  Alleviating factors:        Improved by: Tylenol  Therapies tried and outcome:  none     Pain in groin over the past 2 weeks, not too terrible during sports, plays hockey and softball.  He can manage through that however notices increased pain when coughing or sneezing with increased intra-abdominal pressure.  Has not noticed any bulging in the groin or abdomen.    Review of Systems   Constitutional, HEENT, cardiovascular, " "pulmonary, GI, , musculoskeletal, neuro, skin, endocrine and psych systems are negative, except as otherwise noted.      Objective    /60   Pulse 82   Temp 97.9  F (36.6  C) (Tympanic)   Ht 1.803 m (5' 11\")   Wt 93.4 kg (206 lb)   SpO2 98%   BMI 28.73 kg/m    Body mass index is 28.73 kg/m .  Physical Exam   GENERAL: healthy, alert and no distress   (male): testicles normal without atrophy or masses, inguinal hernia felt on Valsalva on the right side.  And penis normal without urethral discharge.  No other masses noted in the groin or lower abdomen.              Michelle Horner, ARNOLD-FABY     [address] [address]  [email or web address]   Office: [phone]                 "

## 2022-04-25 ENCOUNTER — OFFICE VISIT (OUTPATIENT)
Dept: SURGERY | Facility: CLINIC | Age: 37
End: 2022-04-25
Payer: COMMERCIAL

## 2022-04-25 VITALS
OXYGEN SATURATION: 98 % | WEIGHT: 206 LBS | DIASTOLIC BLOOD PRESSURE: 60 MMHG | BODY MASS INDEX: 28.84 KG/M2 | HEART RATE: 68 BPM | HEIGHT: 71 IN | RESPIRATION RATE: 16 BRPM | SYSTOLIC BLOOD PRESSURE: 112 MMHG

## 2022-04-25 DIAGNOSIS — K40.90 RIGHT INGUINAL HERNIA: Primary | ICD-10-CM

## 2022-04-25 PROCEDURE — 99203 OFFICE O/P NEW LOW 30 MIN: CPT | Performed by: SURGERY

## 2022-04-25 NOTE — PROGRESS NOTES
HPI:  Moses is a 36 year old male who presents for evaluation of right groin discomfort over the last month or so.  The patient noticed this while playing hockey, but has been able to continue to play hockey with his symptoms.  He has not specifically noticed a lump.  The patient has recently noticed some swelling and drainage in the suprapubic lower abdomen.  He feels this is likely due to an ingrown hair.    Past Medical History:  Denies significant past medical history    Past Surgical History:  Past Surgical History:   Procedure Laterality Date     history of palpitation      trey, s/p normal holter monitoring.         Social History:  Social History     Socioeconomic History     Marital status:      Spouse name: Not on file     Number of children: Not on file     Years of education: Not on file     Highest education level: Not on file   Occupational History     Not on file   Tobacco Use     Smoking status: Current Some Day Smoker     Packs/day: 1.00     Smokeless tobacco: Never Used   Substance and Sexual Activity     Alcohol use: Yes     Alcohol/week: 0.0 standard drinks     Comment: occ     Drug use: No     Sexual activity: Yes   Other Topics Concern     Parent/sibling w/ CABG, MI or angioplasty before 65F 55M? No   Social History Narrative     Not on file     Social Determinants of Health     Financial Resource Strain: Not on file   Food Insecurity: Not on file   Transportation Needs: Not on file   Physical Activity: Not on file   Stress: Not on file   Social Connections: Not on file   Intimate Partner Violence: Not on file   Housing Stability: Not on file        Family History:  Family History   Problem Relation Age of Onset     Cardiovascular Father      No Known Problems Mother      No Known Problems Sister          ROS:  The 10 point review of systems is negative other than noted in the HPI and above.    PE:    General- Well-developed, well-nourished, patient able to get up on table without  difficulty.  HEENT- Normocephalic and atraumatic. Pupils equal and round.  Mucous membranes moist.  Sclera are nonicteric.  Neck- No lymphadenopathy or masses   Respirations- are regular and non labored  Abdomen is abdomen is soft without significant tenderness, masses, organomegaly or guarding.  There is a 4 cm pustule in the suprapubic area.  This area is prepped with alcohol and I unroofed it.  The small amount of purulent fluid is expressed, but no obvious ingrown hair is seen.  Hernia- Left inguinal hernia is not obviously present with valsalva              Right inguinal hernia is present with valsalva.  This is very small and easily reducible.   Umbilical hernia is not present.              External genitalia are normal               Assessment: right inguinal hernia    Plan: I have recommended robotic assisted repair of the patient's small right inguinal hernia with mesh.  We will take a look at the left side and fix any obvious hernia seen on that side as well.  We have discussed observation, reduction techniques and importance, incarceration and strangulation signs, symptoms and importance as well as need to seek emergency treatment.    We have discussed surgery in detail, including risk, benefits, complications, mesh, infection, nerve and cord damage and their sequelae including chronic pain and testicular loss, lifting and activity limits after surgery. He has been given literature to review.  The patient will call when he would like to schedule surgery.  A case request has been placed.    A total of 35 minutes was spent today in chart review, patient examination and discussion, and documentation.    Ej Ramirez MD    Please route or send letter to:  Referring Provider

## 2022-04-25 NOTE — LETTER
2022       Re: Moses CANTOR Sonu - 1985    Moses is a 36 year old male who presents for evaluation of right groin discomfort over the last month or so.  The patient noticed this while playing hockey, but has been able to continue to play hockey with his symptoms.  He has not specifically noticed a lump.  The patient has recently noticed some swelling and drainage in the suprapubic lower abdomen.  He feels this is likely due to an ingrown hair.     Past Medical History: Denies significant past medical history      ROS: The 10 point review of systems is negative other than noted in the HPI and above.     PE:    General- Well-developed, well-nourished, patient able to get up on table without difficulty.  HEENT- Normocephalic and atraumatic. Pupils equal and round.  Mucous membranes moist.  Sclera are nonicteric.  Neck- No lymphadenopathy or masses   Respirations- are regular and non labored  Abdomen is abdomen is soft without significant tenderness, masses, organomegaly or guarding. There is a 4 cm pustule in the suprapubic area.  This area is prepped with alcohol and I unroofed it.  The small amount of purulent fluid is expressed, but no obvious ingrown hair is seen.  Hernia- Left inguinal hernia is not obviously present with valsalva              Right inguinal hernia is present with valsalva.  This is very small and easily reducible.              Umbilical hernia is not present.              External genitalia are normal               Assessment: right inguinal hernia     Plan: I have recommended robotic assisted repair of the patient's small right inguinal hernia with mesh. We will take a look at the left side and fix any obvious hernia seen on that side as well.  We have discussed observation, reduction techniques and importance, incarceration and strangulation signs, symptoms and importance as well as need to seek emergency treatment.    We have discussed surgery in detail, including risk, benefits,  complications, mesh, infection, nerve and cord damage and their sequelae including chronic pain and testicular loss, lifting and activity limits after surgery. He has been given literature to review.  The patient will call when he would like to schedule surgery.  A case request has been placed.        Ej Ramirez MD

## 2022-10-15 ENCOUNTER — HEALTH MAINTENANCE LETTER (OUTPATIENT)
Age: 37
End: 2022-10-15

## 2023-06-01 ENCOUNTER — HEALTH MAINTENANCE LETTER (OUTPATIENT)
Age: 38
End: 2023-06-01

## 2023-08-03 ENCOUNTER — OFFICE VISIT (OUTPATIENT)
Dept: URGENT CARE | Facility: URGENT CARE | Age: 38
End: 2023-08-03
Payer: COMMERCIAL

## 2023-08-03 ENCOUNTER — OFFICE VISIT (OUTPATIENT)
Dept: PEDIATRICS | Facility: CLINIC | Age: 38
End: 2023-08-03
Payer: COMMERCIAL

## 2023-08-03 ENCOUNTER — HOSPITAL ENCOUNTER (OUTPATIENT)
Dept: ULTRASOUND IMAGING | Facility: CLINIC | Age: 38
Discharge: HOME | End: 2023-08-03
Attending: NURSE PRACTITIONER | Admitting: NURSE PRACTITIONER
Payer: COMMERCIAL

## 2023-08-03 VITALS
TEMPERATURE: 97.8 F | SYSTOLIC BLOOD PRESSURE: 132 MMHG | WEIGHT: 199 LBS | HEART RATE: 60 BPM | OXYGEN SATURATION: 98 % | DIASTOLIC BLOOD PRESSURE: 86 MMHG | RESPIRATION RATE: 18 BRPM | BODY MASS INDEX: 27.75 KG/M2

## 2023-08-03 VITALS
WEIGHT: 199 LBS | OXYGEN SATURATION: 99 % | BODY MASS INDEX: 27.86 KG/M2 | HEIGHT: 71 IN | TEMPERATURE: 97.3 F | DIASTOLIC BLOOD PRESSURE: 70 MMHG | HEART RATE: 77 BPM | RESPIRATION RATE: 18 BRPM | SYSTOLIC BLOOD PRESSURE: 130 MMHG

## 2023-08-03 DIAGNOSIS — M79.89 LEFT LEG SWELLING: ICD-10-CM

## 2023-08-03 DIAGNOSIS — M79.89 LEFT LEG SWELLING: Primary | ICD-10-CM

## 2023-08-03 DIAGNOSIS — I80.02 THROMBOPHLEBITIS OF SUPERFICIAL VEINS OF LEFT LOWER EXTREMITY: Primary | ICD-10-CM

## 2023-08-03 PROCEDURE — 93971 EXTREMITY STUDY: CPT | Mod: LT

## 2023-08-03 PROCEDURE — 99215 OFFICE O/P EST HI 40 MIN: CPT | Performed by: NURSE PRACTITIONER

## 2023-08-03 PROCEDURE — 99207 PR FIRST ORDER ACUTE REFERRAL: CPT | Mod: 25 | Performed by: PHYSICIAN ASSISTANT

## 2023-08-03 PROCEDURE — 99207 REFERRAL TO ACUTE AND DIAGNOSTIC SERVICES: CPT | Performed by: PHYSICIAN ASSISTANT

## 2023-08-03 NOTE — PROGRESS NOTES
"  Assessment & Plan     Left leg swelling    - Referral to Acute and Diagnostic Services (Day of diagnostic / First order acute)  - US Lower Extremity Venous Duplex Left; Future    Thrombophlebitis of superficial veins of left lower extremity    Patient Instructions     Results for orders placed or performed during the hospital encounter of 08/03/23   US Lower Extremity Venous Duplex Left     Status: None    Narrative    VENOUS ULTRASOUND LEFT LEG  8/3/2023 1:37 PM     HISTORY: Left leg swelling    COMPARISON: None.    FINDINGS:  Examination of the deep veins with graded compression and  color flow Doppler with spectral wave form analysis was performed.   There is no evidence for DVT in the left lower extremity. There is  superficial venous thrombus in the left greater saphenous vein  extending from the mid calf to the ankle.      Impression    IMPRESSION: No evidence of deep venous thrombosis.  Superficial venous  thrombus in the left greater saphenous vein.    CATHIE MATHEWS MD         SYSTEM ID:  [id number]     There is no DVT noted on the US.      There is a superficial clot on the front of your shin  Take aleve twice a day for a week and apply warm compresses several times a day to this area.    Try to reduce sodium and elevate your legs when you are seated.    Follow up with your PCP if this persists or worsens.         BMI:   Estimated body mass index is 27.75 kg/m  as calculated from the following:    Height as of an earlier encounter on 8/3/23: 1.803 m (5' 11\").    Weight as of this encounter: 90.3 kg (199 lb).   Return in about 1 week (around 8/10/2023) for with regular provider if symptoms persist.    DILLAN Brito [facility] TERESA Lopes is a 38 year old, presenting for the following health issues:  Leg Pain (Left leg pain X 2 days)          HPI     Evaluation for possible DVT  Onset/Duration: X 2 days  Description:       Location: Left leg       Redness: " YES       Pain: 5/10       Warmth: YES       Joint swelling YES  Progression of symptoms worse  Accompanying signs and symptoms:       Fevers: no        Numbness/tingling/weakness: YES- tingling and numbness notes in foot       Chest pain/pleurisy: no        Shortness of breath: no   History        Trauma: YES- notes left knee injury X 4 days ago, playing hockey-was evaluated at [facility] for structural damage.  O following up with MRI for potential ligament damage.        Recent travel/when: no         Previous history of DVT: no         Family history of DVT: YES- father post ACL surgery        Recent surgery: no   Aggravating factors include: standing, certain positions  Therapies tried and outcome: acetaminophen and Ibuprofen-this has helped with pain.  Prior surgery on arteries of veins in this area: No      Review of Systems   Constitutional, HEENT, cardiovascular, pulmonary, GI, , musculoskeletal, neuro, skin, endocrine and psych systems are negative, except as otherwise noted.      Objective    /86 (BP Location: Left arm, Patient Position: Sitting, Cuff Size: Adult Regular)   Pulse 60   Temp 97.8  F (36.6  C) (Oral)   Resp 18   Wt 90.3 kg (199 lb)   SpO2 98%   BMI 27.75 kg/m    Body mass index is 27.75 kg/m .  Physical Exam   GENERAL: healthy, alert and no distress  RESP: lungs clear to auscultation - no rales, rhonchi or wheezes  CV: regular rate and rhythm, normal S1 S2, no S3 or S4, no murmur, click or rub, no peripheral edema and peripheral pulses strong  MS: 2+ edema to distal left lower leg, peripheral pulses normal, and tenderness to palpation anterior distal left shin.    SKIN: no suspicious lesions or rashes

## 2023-08-03 NOTE — PATIENT INSTRUCTIONS
Results for orders placed or performed during the hospital encounter of 08/03/23   US Lower Extremity Venous Duplex Left     Status: None    Narrative    VENOUS ULTRASOUND LEFT LEG  8/3/2023 1:37 PM     HISTORY: Left leg swelling    COMPARISON: None.    FINDINGS:  Examination of the deep veins with graded compression and  color flow Doppler with spectral wave form analysis was performed.   There is no evidence for DVT in the left lower extremity. There is  superficial venous thrombus in the left greater saphenous vein  extending from the mid calf to the ankle.      Impression    IMPRESSION: No evidence of deep venous thrombosis.  Superficial venous  thrombus in the left greater saphenous vein.    CATHIE MATHEWS MD         SYSTEM ID:  [id number]     There is no DVT noted on the US.      There is a superficial clot on the front of your shin  Take aleve twice a day for a week and apply warm compresses several times a day to this area.    Try to reduce sodium and elevate your legs when you are seated.    Follow up with your PCP if this persists or worsens.

## 2023-08-03 NOTE — PROGRESS NOTES
Assessment & Plan     Left leg swelling  Patient with unilateral lower extremity swelling following an injury to the same extremity several days ago.  This could represent a DVT versus superficial thrombophlebitis versus other potential etiology.  Will refer patient to acute diagnostic services clinic for definitive testing.  Patient in agreement with the plan.  - Referral to Acute and Diagnostic Services (Day of diagnostic / First order acute)     I spent a total of 20 minutes on the day of the visit.   Time spent by me doing chart review, history and exam, documentation and further activities per the note    Referral to Acute and Diagnostic Services    [phone] ([place]) [place] [address] [address] [facility], [address], [place] [id number]    Transition to Acute & Diagnostic Services Clinic has been discussed with patient, and he agrees with next level of care.   Patient understands that evaluation/treatment at [facility] typically takes significantly longer than in clinic/urgent care (>2 hours).  The [facility] Acute and Diagnostics Services Clinic has been contacted by provider/staff to confirm patient acceptance.     Special issues: None    The following provider has assessed this patient for intervention at [facility], and directed the patient for referral: Gurwinder Miranda PA-C    Return today (on 8/3/2023).    Gurwinder Miranda PA-C  [facility] Lopes is a 38 year old male who presents to clinic today for the following health issues:  Chief Complaint   Patient presents with    Urgent Care     Patient states he is being followed by [facility] for his right knee x 2 days, patient noticed yesterday that he has a little lump that is hardening and red, and itchy that radiates to the right foot x 1 days. Want to rule a blood clot. Family history.      HPI  Patient is a 38-year-old male without significant history presenting to urgent care for evaluation of left lower  "extremity swelling.  Patient was playing hockey this past weekend and injured his left knee, was seen by [facility] and has a pending MRI.  Patient noticed yesterday that he started developing some focal redness and swelling along the medial aspect of his left lower leg.  Notes that the left lower leg is also started to swell.  Patient has a family history of his father who developed a provoked blood clot following an ACL injury and is concerned that this may represent the same.  He denies any other systemic complaints at this time.  Denies any other long bone or joint pain.    Review of Systems  Focused ROS obtained, pertinent positives/negatives reviewed in the HPI.       Objective    /70   Pulse 77   Temp 97.3  F (36.3  C) (Tympanic)   Resp 18   Ht 1.803 m (5' 11\")   Wt 90.3 kg (199 lb)   SpO2 99%   BMI 27.75 kg/m    Physical Exam   GENERAL: healthy, alert and no distress  MS: Left lower leg with erythematous area along the medial mid lower leg with some focal swelling, tender to palpation, warm to touch.  Left lower leg does appear slightly larger than the contralateral limb.  Normal range of motion of the ankle and foot.  Patient is wearing a knee brace on the left lower leg.      "

## 2023-08-15 ENCOUNTER — TRANSFERRED RECORDS (OUTPATIENT)
Dept: HEALTH INFORMATION MANAGEMENT | Facility: CLINIC | Age: 38
End: 2023-08-15
Payer: COMMERCIAL

## 2023-08-23 ENCOUNTER — OFFICE VISIT (OUTPATIENT)
Dept: PEDIATRICS | Facility: CLINIC | Age: 38
End: 2023-08-23
Payer: COMMERCIAL

## 2023-08-23 VITALS
BODY MASS INDEX: 31.11 KG/M2 | TEMPERATURE: 98 F | OXYGEN SATURATION: 98 % | RESPIRATION RATE: 16 BRPM | WEIGHT: 222.2 LBS | HEIGHT: 71 IN | HEART RATE: 83 BPM | DIASTOLIC BLOOD PRESSURE: 85 MMHG | SYSTOLIC BLOOD PRESSURE: 152 MMHG

## 2023-08-23 DIAGNOSIS — Z01.818 PREOP GENERAL PHYSICAL EXAM: Primary | ICD-10-CM

## 2023-08-23 DIAGNOSIS — S83.282D ACUTE LATERAL MENISCUS TEAR OF LEFT KNEE, SUBSEQUENT ENCOUNTER: ICD-10-CM

## 2023-08-23 LAB — HGB BLD-MCNC: 14.9 G/DL (ref 13.3–17.7)

## 2023-08-23 PROCEDURE — 85018 HEMOGLOBIN: CPT | Performed by: STUDENT IN AN ORGANIZED HEALTH CARE EDUCATION/TRAINING PROGRAM

## 2023-08-23 PROCEDURE — 99214 OFFICE O/P EST MOD 30 MIN: CPT | Mod: GC | Performed by: STUDENT IN AN ORGANIZED HEALTH CARE EDUCATION/TRAINING PROGRAM

## 2023-08-23 PROCEDURE — 93000 ELECTROCARDIOGRAM COMPLETE: CPT | Performed by: STUDENT IN AN ORGANIZED HEALTH CARE EDUCATION/TRAINING PROGRAM

## 2023-08-23 PROCEDURE — 36415 COLL VENOUS BLD VENIPUNCTURE: CPT | Performed by: STUDENT IN AN ORGANIZED HEALTH CARE EDUCATION/TRAINING PROGRAM

## 2023-08-23 SDOH — HEALTH STABILITY: PHYSICAL HEALTH: ON AVERAGE, HOW MANY DAYS PER WEEK DO YOU ENGAGE IN MODERATE TO STRENUOUS EXERCISE (LIKE A BRISK WALK)?: 5 DAYS

## 2023-08-23 SDOH — ECONOMIC STABILITY: TRANSPORTATION INSECURITY
IN THE PAST 12 MONTHS, HAS LACK OF TRANSPORTATION KEPT YOU FROM MEETINGS, WORK, OR FROM GETTING THINGS NEEDED FOR DAILY LIVING?: NO

## 2023-08-23 SDOH — HEALTH STABILITY: PHYSICAL HEALTH: ON AVERAGE, HOW MANY MINUTES DO YOU ENGAGE IN EXERCISE AT THIS LEVEL?: 60 MIN

## 2023-08-23 SDOH — ECONOMIC STABILITY: FOOD INSECURITY: WITHIN THE PAST 12 MONTHS, THE FOOD YOU BOUGHT JUST DIDN'T LAST AND YOU DIDN'T HAVE MONEY TO GET MORE.: NEVER TRUE

## 2023-08-23 SDOH — ECONOMIC STABILITY: INCOME INSECURITY: IN THE LAST 12 MONTHS, WAS THERE A TIME WHEN YOU WERE NOT ABLE TO PAY THE MORTGAGE OR RENT ON TIME?: NO

## 2023-08-23 SDOH — ECONOMIC STABILITY: INCOME INSECURITY: HOW HARD IS IT FOR YOU TO PAY FOR THE VERY BASICS LIKE FOOD, HOUSING, MEDICAL CARE, AND HEATING?: NOT HARD AT ALL

## 2023-08-23 SDOH — ECONOMIC STABILITY: TRANSPORTATION INSECURITY
IN THE PAST 12 MONTHS, HAS THE LACK OF TRANSPORTATION KEPT YOU FROM MEDICAL APPOINTMENTS OR FROM GETTING MEDICATIONS?: NO

## 2023-08-23 SDOH — ECONOMIC STABILITY: FOOD INSECURITY: WITHIN THE PAST 12 MONTHS, YOU WORRIED THAT YOUR FOOD WOULD RUN OUT BEFORE YOU GOT MONEY TO BUY MORE.: NEVER TRUE

## 2023-08-23 ASSESSMENT — SOCIAL DETERMINANTS OF HEALTH (SDOH)
IN A TYPICAL WEEK, HOW MANY TIMES DO YOU TALK ON THE PHONE WITH FAMILY, FRIENDS, OR NEIGHBORS?: ONCE A WEEK
DO YOU BELONG TO ANY CLUBS OR ORGANIZATIONS SUCH AS CHURCH GROUPS UNIONS, FRATERNAL OR ATHLETIC GROUPS, OR SCHOOL GROUPS?: YES
HOW OFTEN DO YOU ATTEND CHURCH OR RELIGIOUS SERVICES?: 1 TO 4 TIMES PER YEAR
HOW OFTEN DO YOU GET TOGETHER WITH FRIENDS OR RELATIVES?: MORE THAN THREE TIMES A WEEK

## 2023-08-23 ASSESSMENT — LIFESTYLE VARIABLES
HOW OFTEN DO YOU HAVE A DRINK CONTAINING ALCOHOL: 2-3 TIMES A WEEK
HOW OFTEN DO YOU HAVE SIX OR MORE DRINKS ON ONE OCCASION: LESS THAN MONTHLY
HOW MANY STANDARD DRINKS CONTAINING ALCOHOL DO YOU HAVE ON A TYPICAL DAY: 1 OR 2
AUDIT-C TOTAL SCORE: 4
SKIP TO QUESTIONS 9-10: 0

## 2023-08-23 ASSESSMENT — PAIN SCALES - GENERAL: PAINLEVEL: MILD PAIN (3)

## 2023-08-23 NOTE — PROGRESS NOTES
[address]  [address] [place] [address]  Phone: [phone]  Fax: [phone]  Primary Provider: Kayce - [facility]  Pre-op Performing Provider: DARRICK HARKINS      PREOPERATIVE EVALUATION:  Today's date: 2023    Moses Ascencio is a 38 year old male who presents for a preoperative evaluation.      Surgical Information:  Surgery/Procedure: Knee Surgery for meniscus   Surgery Location: [facility]  Surgeon: Dr. Salas  Surgery Date: 23  Time of Surgery: 7 am  Where patient plans to recover: At home with family  Fax number for surgical facility: [phone]    Assessment & Plan     The proposed surgical procedure is considered INTERMEDIATE risk.    Preop general physical exam  Acute lateral meniscus tear of left knee, subsequent encounter  Planned meniscus repair though told based on what they find may need a more significant operation therefore will check a Hgb. Does not take any medications or have any chronic medical conditions. Father  of an arrhythmia, he had prior holter, but will still check EKG today.   - EKG 12-lead complete w/read - Clinics  - Hemoglobin      Possible Sleep Apnea: Snores but apnea not noted       Risks and Recommendations:  The patient has the following additional risks and recommendations for perioperative complications:   - No identified additional risk factors other than previously addressed    Antiplatelet or Anticoagulation Medication Instructions:   - Patient is on no antiplatelet or anticoagulation medications.    Additional Medication Instructions:  Patient is on no additional chronic medications    RECOMMENDATION:  APPROVAL GIVEN to proceed with proposed procedure pending review of diagnostic evaluation.    Subjective     Injured his left knee meniscus playing hockey. Saw orthopedics who recommended surgical repair.             2023     2:07 PM   Preop Questions   1.  Have you ever had a heart attack or stroke? No   2. Have you ever had surgery on your heart or blood vessels, such as a stent placement, a coronary artery bypass, or surgery on an artery in your head, neck, heart, or legs? No   3. Do you have chest pain with activity? No   4. Do you have a history of  heart failure? No person, father  of arrhythmia   5. Do you currently have a cold, bronchitis or symptoms of other infection? No   6. Do you have a cough, shortness of breath, or wheezing? No   7. Do you or anyone in your family have previous history of blood clots? YES - Father had provoked after knee injury   8. Do you or does anyone in your family have a serious bleeding problem such as prolonged bleeding following surgeries or cuts? No   9. Have you ever had problems with anemia or been told to take iron pills? No   10. Have you had any abnormal blood loss such as black, tarry or bloody stools? No   11. Have you ever had a blood transfusion? No   12. Are you willing to have a blood transfusion if it is medically needed before, during, or after your surgery? Yes   13. Have you or any of your relatives ever had problems with anesthesia? No   14. Do you have sleep apnea, excessive snoring or daytime drowsiness? YES - Snoring, no diagnosed apnea   14a. Do you have a CPAP machine? No   15. Do you have any artifical heart valves or other implanted medical devices like a pacemaker, defibrillator, or continuous glucose monitor? No   16. Do you have artificial joints? No   17. Are you allergic to latex? No     Health Care Directive:  Patient does not have a Health Care Directive or Living Will: Discussed advance care planning with patient; however, patient declined at this time.      Review of Systems  CONSTITUTIONAL: NEGATIVE for fever, chills, change in weight  INTEGUMENTARY/SKIN: NEGATIVE for worrisome rashes, moles or lesions  EYES: NEGATIVE for vision changes or irritation  ENT/MOUTH: NEGATIVE for ear, mouth and  throat problems  RESP: NEGATIVE for significant cough or SOB  CV: NEGATIVE for chest pain, palpitations or peripheral edema  GI: NEGATIVE for nausea, abdominal pain, heartburn, or change in bowel habits  : NEGATIVE for frequency, dysuria, or hematuria  MUSCULOSKELETAL: NEGATIVE for significant arthralgias or myalgia  NEURO: NEGATIVE for weakness, dizziness or paresthesias  HEME: NEGATIVE for bleeding problems    Patient Active Problem List    Diagnosis Date Noted    Dyspepsia and disorder of function of stomach 09/25/2018     Priority: Medium    Migraine headache 05/08/2017     Priority: Medium      No past medical history on file.  Past Surgical History:   Procedure Laterality Date    history of palpitation      trey, s/p normal holter monitoring.      No current outpatient medications on file.       No Known Allergies     Social History     Tobacco Use    Smoking status: Former     Packs/day: 1.00     Types: Cigarettes    Smokeless tobacco: Current     Types: Snuff    Tobacco comments:     Pouches to ween off cigs   Substance Use Topics    Alcohol use: Yes     Alcohol/week: 0.0 standard drinks of alcohol     Comment: occ     Family History   Problem Relation Age of Onset    No Known Problems Mother     Cardiovascular Father     Deep Vein Thrombosis Father     No Known Problems Sister      History   Drug Use No         Objective     There were no vitals taken for this visit.    Physical Exam    GENERAL APPEARANCE: healthy, alert and no distress     EYES: EOMI,  PERRL     HENT: Nose and mouth without ulcers or lesions     RESP: lungs clear to auscultation - no rales, rhonchi or wheezes     CV: regular rates and rhythm, normal S1 S2, no S3 or S4 and no murmur, click or rub     ABDOMEN:  soft, nontender, no HSM or masses      MS: extremities normal- no gross deformities noted, no evidence of inflammation in joints     SKIN: no suspicious lesions or rashes     NEURO: Mentation intact and speech normal     PSYCH:  mentation appears normal. and affect normal/bright    No results for input(s): HGB, PLT, INR, NA, POTASSIUM, CR, A1C in the last 35982 hours.     Diagnostics:  Labs pending at this time.  Results will be reviewed when available.   EKG: appears normal, NSR, normal axis, normal intervals, no acute ST/T changes c/w ischemia, no LVH by voltage criteria    Revised Cardiac Risk Index (RCRI):  The patient has the following serious cardiovascular risks for perioperative complications:   - No serious cardiac risks = 0 points     RCRI Interpretation: 0 points: Class I (very low risk - 0.4% complication rate)         Signed Electronically by: Stalin Ratliff MD  Copy of this evaluation report is provided to requesting physician.

## 2023-08-23 NOTE — PATIENT INSTRUCTIONS
Great to meet you in clinic!  For pre-op for surgery:  - Hgb check  - EKG    If these look ok (which we expect) you will be cleared for surgery.      For informational purposes only. Not to replace the advice of your health care provider. Copyright   2003, 2019 [facility] [employer]. All rights reserved. Clinically reviewed by Yomaira Mart MD. [facility] [id number] - REV .  Preparing for Your Surgery  Getting started  A nurse will call you to review your health history and instructions. They will give you an arrival time based on your scheduled surgery time. Please be ready to share:  Your doctor's clinic name and phone number  Your medical, surgical, and anesthesia history  A list of allergies and sensitivities  A list of medicines, including herbal treatments and over-the-counter drugs  Whether the patient has a legal guardian (ask how to send us the papers in advance)  Please tell us if you're pregnant--or if there's any chance you might be pregnant. Some surgeries may injure a fetus (unborn baby), so they require a pregnancy test. Surgeries that are safe for a fetus don't always need a test, and you can choose whether to have one.   If you have a child who's having surgery, please ask for a copy of Preparing for Your Child's Surgery.    Preparing for surgery  Within 10 to 30 days of surgery: Have a pre-op exam (sometimes called an H&P, or History and Physical). This can be done at a clinic or pre-operative center.  If you're having a , you may not need this exam. Talk to your care team.  At your pre-op exam, talk to your care team about all medicines you take. If you need to stop any medicines before surgery, ask when to start taking them again.  We do this for your safety. Many medicines can make you bleed too much during surgery. Some change how well surgery (anesthesia) drugs work.  Call your insurance company to let them know you're having surgery. (If you don't have insurance, call  [phone].)  Call your clinic if there's any change in your health. This includes signs of a cold or flu (sore throat, runny nose, cough, rash, fever). It also includes a scrape or scratch near the surgery site.  If you have questions on the day of surgery, call your hospital or surgery center.  Eating and drinking guidelines  For your safety: Unless your surgeon tells you otherwise, follow the guidelines below.  Eat and drink as usual until 8 hours before you arrive for surgery. After that, no food or milk.  Drink clear liquids until 2 hours before you arrive. These are liquids you can see through, like water, Gatorade, and Propel Water. They also include plain black coffee and tea (no cream or milk), candy, and breath mints. You can spit out gum when you arrive.  If you drink alcohol: Stop drinking it the night before surgery.  If your care team tells you to take medicine on the morning of surgery, it's okay to take it with a sip of water.  Preventing infection  Shower or bathe the night before and morning of your surgery. Follow the instructions your clinic gave you. (If no instructions, use regular soap.)  Don't shave or clip hair near your surgery site. We'll remove the hair if needed.  Don't smoke or vape the morning of surgery. You may chew nicotine gum up to 2 hours before surgery. A nicotine patch is okay.  Note: Some surgeries require you to completely quit smoking and nicotine. Check with your surgeon.  Your care team will make every effort to keep you safe from infection. We will:  Clean our hands often with soap and water (or an alcohol-based hand rub).  Clean the skin at your surgery site with a special soap that kills germs.  Give you a special gown to keep you warm. (Cold raises the risk of infection.)  Wear special hair covers, masks, gowns and gloves during surgery.  Give antibiotic medicine, if prescribed. Not all surgeries need antibiotics.  What to bring on the day of surgery  Photo ID and  insurance card  Copy of your health care directive, if you have one  Glasses and hearing aids (bring cases)  You can't wear contacts during surgery  Inhaler and eye drops, if you use them (tell us about these when you arrive)  CPAP machine or breathing device, if you use them  A few personal items, if spending the night  If you have . . .  A pacemaker, ICD (cardiac defibrillator) or other implant: Bring the ID card.  An implanted stimulator: Bring the remote control.  A legal guardian: Bring a copy of the certified (court-stamped) guardianship papers.  Please remove any jewelry, including body piercings. Leave jewelry and other valuables at home.  If you're going home the day of surgery  You must have a responsible adult drive you home. They should stay with you overnight as well.  If you don't have someone to stay with you, and you aren't safe to go home alone, we may keep you overnight. Insurance often won't pay for this.  After surgery  If it's hard to control your pain or you need more pain medicine, please call your surgeon's office.  Questions?   If you have any questions for your care team, list them here: _________________________________________________________________________________________________________________________________________________________________________ ____________________________________ ____________________________________ ____________________________________    How to Take Your Medication Before Surgery  Not taking any medications

## 2023-08-25 ENCOUNTER — TRANSFERRED RECORDS (OUTPATIENT)
Dept: HEALTH INFORMATION MANAGEMENT | Facility: CLINIC | Age: 38
End: 2023-08-25
Payer: COMMERCIAL

## 2024-08-04 ENCOUNTER — HEALTH MAINTENANCE LETTER (OUTPATIENT)
Age: 39
End: 2024-08-04

## 2025-08-16 ENCOUNTER — HEALTH MAINTENANCE LETTER (OUTPATIENT)
Age: 40
End: 2025-08-16